# Patient Record
Sex: MALE | URBAN - METROPOLITAN AREA
[De-identification: names, ages, dates, MRNs, and addresses within clinical notes are randomized per-mention and may not be internally consistent; named-entity substitution may affect disease eponyms.]

---

## 2023-04-21 ENCOUNTER — OFFICE VISIT (OUTPATIENT)
Dept: URBAN - METROPOLITAN AREA CLINIC 71 | Facility: CLINIC | Age: 38
End: 2023-04-21
Payer: COMMERCIAL

## 2023-04-21 DIAGNOSIS — H43.393 OTHER VITREOUS OPACITIES, BILATERAL: ICD-10-CM

## 2023-04-21 DIAGNOSIS — H52.223 REGULAR ASTIGMATISM, BILATERAL: Primary | ICD-10-CM

## 2023-04-21 PROCEDURE — 92004 COMPRE OPH EXAM NEW PT 1/>: CPT | Performed by: OPTOMETRIST

## 2023-04-21 ASSESSMENT — INTRAOCULAR PRESSURE
OS: 14
OD: 13

## 2023-04-21 ASSESSMENT — KERATOMETRY
OS: 39.75
OD: 40.00

## 2023-04-21 ASSESSMENT — VISUAL ACUITY
OD: 20/20
OS: 20/20

## 2023-04-21 NOTE — IMPRESSION/PLAN
Impression: Other vitreous opacities, bilateral: H43.393.  Plan: Continue to monitor with yearly DE.